# Patient Record
Sex: MALE | Race: WHITE
[De-identification: names, ages, dates, MRNs, and addresses within clinical notes are randomized per-mention and may not be internally consistent; named-entity substitution may affect disease eponyms.]

---

## 2019-01-02 ENCOUNTER — HOSPITAL ENCOUNTER (INPATIENT)
Dept: HOSPITAL 54 - ER | Age: 39
LOS: 1 days | Discharge: HOME | DRG: 310 | End: 2019-01-03
Attending: INTERNAL MEDICINE | Admitting: INTERNAL MEDICINE
Payer: COMMERCIAL

## 2019-01-02 VITALS — BODY MASS INDEX: 38.04 KG/M2 | HEIGHT: 68 IN | WEIGHT: 251 LBS

## 2019-01-02 DIAGNOSIS — E66.9: ICD-10-CM

## 2019-01-02 DIAGNOSIS — J45.909: ICD-10-CM

## 2019-01-02 DIAGNOSIS — Z88.0: ICD-10-CM

## 2019-01-02 DIAGNOSIS — Z82.49: ICD-10-CM

## 2019-01-02 DIAGNOSIS — I47.1: Primary | ICD-10-CM

## 2019-01-02 DIAGNOSIS — Z83.3: ICD-10-CM

## 2019-01-02 LAB
BASOPHILS # BLD AUTO: 0.1 /CMM (ref 0–0.2)
BASOPHILS NFR BLD AUTO: 0.7 % (ref 0–2)
BUN SERPL-MCNC: 13 MG/DL (ref 7–18)
CALCIUM SERPL-MCNC: 9.1 MG/DL (ref 8.5–10.1)
CHLORIDE SERPL-SCNC: 101 MMOL/L (ref 98–107)
CO2 SERPL-SCNC: 30 MMOL/L (ref 21–32)
CREAT SERPL-MCNC: 1.1 MG/DL (ref 0.6–1.3)
EOSINOPHIL NFR BLD AUTO: 1.7 % (ref 0–6)
GLUCOSE SERPL-MCNC: 90 MG/DL (ref 74–106)
HCT VFR BLD AUTO: 44 % (ref 39–51)
HGB BLD-MCNC: 15 G/DL (ref 13.5–17.5)
LYMPHOCYTES NFR BLD AUTO: 2.4 /CMM (ref 0.8–4.8)
LYMPHOCYTES NFR BLD AUTO: 28.2 % (ref 20–44)
MCHC RBC AUTO-ENTMCNC: 34 G/DL (ref 31–36)
MCV RBC AUTO: 82 FL (ref 80–96)
MONOCYTES NFR BLD AUTO: 0.7 /CMM (ref 0.1–1.3)
MONOCYTES NFR BLD AUTO: 7.7 % (ref 2–12)
NEUTROPHILS # BLD AUTO: 5.3 /CMM (ref 1.8–8.9)
NEUTROPHILS NFR BLD AUTO: 61.7 % (ref 43–81)
PLATELET # BLD AUTO: 260 /CMM (ref 150–450)
POTASSIUM SERPL-SCNC: 4 MMOL/L (ref 3.5–5.1)
RBC # BLD AUTO: 5.36 MIL/UL (ref 4.5–6)
SODIUM SERPL-SCNC: 140 MMOL/L (ref 136–145)
WBC NRBC COR # BLD AUTO: 8.6 K/UL (ref 4.3–11)

## 2019-01-02 PROCEDURE — G0378 HOSPITAL OBSERVATION PER HR: HCPCS

## 2019-01-02 NOTE — NUR
PATIENT IN BED, DENIES PAIN/DISCOMFORT AT THIS TIME, UPDATED REGARDING PLAN OF 
CARE, WITH VERBALIZATION OF UNDERSTANDING

## 2019-01-02 NOTE — NUR
PT BIB RA. COMP OF "MY HEART WAS RACING OUT OF MY CHEST". NO DIZZINESS NOTED. 
NO SOB NOTED. NO CHEST PAIN NOTED. NO ACUTE DISTRESS AT THIS TIME. PT AOX4. 
AMBULATORY W,ASSISTANCE. AWAITING MD LUNDBERG.

## 2019-01-02 NOTE — NUR
IV PRESENT UPON ARRIVAL, #18 G, LEFT HAND, FLUSHED WITH NS, PATENT AND INTACT, 
NO S/S OF INFILTRATION OR PHLEBITIS. TECH AT BEDSIDE FOR EKG, CXR TAKEN, URINE 
COLLECTED AND SENT TO LAB

## 2019-01-03 VITALS — DIASTOLIC BLOOD PRESSURE: 79 MMHG | SYSTOLIC BLOOD PRESSURE: 125 MMHG

## 2019-01-03 VITALS — SYSTOLIC BLOOD PRESSURE: 134 MMHG | DIASTOLIC BLOOD PRESSURE: 88 MMHG

## 2019-01-03 VITALS — DIASTOLIC BLOOD PRESSURE: 78 MMHG | SYSTOLIC BLOOD PRESSURE: 124 MMHG

## 2019-01-03 LAB
ALBUMIN SERPL BCP-MCNC: 3.4 G/DL (ref 3.4–5)
ALP SERPL-CCNC: 82 U/L (ref 46–116)
ALT SERPL W P-5'-P-CCNC: 53 U/L (ref 12–78)
AST SERPL W P-5'-P-CCNC: 26 U/L (ref 15–37)
BASOPHILS # BLD AUTO: 0.1 /CMM (ref 0–0.2)
BASOPHILS NFR BLD AUTO: 0.7 % (ref 0–2)
BILIRUB SERPL-MCNC: 0.5 MG/DL (ref 0.2–1)
BUN SERPL-MCNC: 13 MG/DL (ref 7–18)
CALCIUM SERPL-MCNC: 8.7 MG/DL (ref 8.5–10.1)
CHLORIDE SERPL-SCNC: 103 MMOL/L (ref 98–107)
CHOLEST SERPL-MCNC: 150 MG/DL (ref ?–200)
CO2 SERPL-SCNC: 29 MMOL/L (ref 21–32)
CREAT SERPL-MCNC: 0.9 MG/DL (ref 0.6–1.3)
EOSINOPHIL NFR BLD AUTO: 1.5 % (ref 0–6)
GLUCOSE SERPL-MCNC: 88 MG/DL (ref 74–106)
HCT VFR BLD AUTO: 40 % (ref 39–51)
HDLC SERPL-MCNC: 44 MG/DL (ref 40–60)
HGB BLD-MCNC: 13.6 G/DL (ref 13.5–17.5)
LDLC SERPL DIRECT ASSAY-MCNC: 105 MG/DL (ref 0–99)
LYMPHOCYTES NFR BLD AUTO: 2.9 /CMM (ref 0.8–4.8)
LYMPHOCYTES NFR BLD AUTO: 32.5 % (ref 20–44)
MAGNESIUM SERPL-MCNC: 1.9 MG/DL (ref 1.8–2.4)
MCHC RBC AUTO-ENTMCNC: 34 G/DL (ref 31–36)
MCV RBC AUTO: 82 FL (ref 80–96)
MONOCYTES NFR BLD AUTO: 0.8 /CMM (ref 0.1–1.3)
MONOCYTES NFR BLD AUTO: 8.7 % (ref 2–12)
NEUTROPHILS # BLD AUTO: 5 /CMM (ref 1.8–8.9)
NEUTROPHILS NFR BLD AUTO: 56.6 % (ref 43–81)
PHOSPHATE SERPL-MCNC: 3.8 MG/DL (ref 2.5–4.9)
PLATELET # BLD AUTO: 237 /CMM (ref 150–450)
POTASSIUM SERPL-SCNC: 3.8 MMOL/L (ref 3.5–5.1)
PROT SERPL-MCNC: 7.2 G/DL (ref 6.4–8.2)
RBC # BLD AUTO: 4.83 MIL/UL (ref 4.5–6)
SODIUM SERPL-SCNC: 139 MMOL/L (ref 136–145)
TRIGL SERPL-MCNC: 50 MG/DL (ref 30–150)
WBC NRBC COR # BLD AUTO: 8.9 K/UL (ref 4.3–11)

## 2019-01-03 NOTE — NUR
TELE RN NOTE

PT IN BED ASLEEP, EASILY AROUSABLE. NO DISTRESS OR DISCOMFORT NOTED. DENIES PAIN. ON TELE SR 
HR 70. ALL NEEDS ATTENDED. SIDE RAILS UP  X2 AND CALL LIGHT WITHIN REACH. WILL ENDORSE TO 
DAY SHIFT NURSE FOR CONTINUE TO CARE.

## 2019-01-03 NOTE — NUR
TELE RN NOTE

RECEIVED PT 38 YEARS OLD FROM ER WITH THE DX OF SVT BY DR LOPEZ. PT IS A/O X 4, NO SOB, NO 
DISTRESS OR DISCOMFORT NOTED. DENIES PAIN. ON TELE MONITOR SR. LT HAND # 18 G SL INTACT AND 
PATENT. VSS. NO SKIN ISSUES NOTED. ORIENTED THE PT TO HIS ROOM. SIDE RAILS UP X 3 AND CALL 
LIGHT WITHIN REACH. CONTINUE TO MONITOR HIM.

## 2019-01-03 NOTE — NUR
Patient resides locally with family. He is ambulatory and independent with adl's, working 
and driving. Has good family support. His pcp is Dr. Yuliet Figueroa. Plan to dc home today. 

-------------------------------------------------------------------------------

Addendum: 01/03/19 at 1627 by CHANO FELICIANO RN

-------------------------------------------------------------------------------

Amended: Links added.

## 2019-01-03 NOTE — NUR
PATIENT TRANSPORTED TO TELEMETRY 1ST FLOOR, ROOM 107 BY RN AND EMT VIA ACLS 
PROTOCOL, TOLERATED TRANSFER WELL. CARE ENDORSED TO NURSE ANGEL

## 2019-10-10 NOTE — NUR
Charge nurse discharging patient at this time. Pt AAOx4 with ABc's intact. Ambulatory with 
steady gait. IV removed with catheter tip intact. Gauze and tape placed. pt tolerated well. negative

## 2023-05-29 ENCOUNTER — HOSPITAL ENCOUNTER (EMERGENCY)
Dept: HOSPITAL 54 - ER | Age: 43
Discharge: HOME | End: 2023-05-29
Payer: COMMERCIAL

## 2023-05-29 VITALS — HEIGHT: 68 IN | WEIGHT: 240 LBS | BODY MASS INDEX: 36.37 KG/M2

## 2023-05-29 VITALS — DIASTOLIC BLOOD PRESSURE: 98 MMHG | SYSTOLIC BLOOD PRESSURE: 154 MMHG
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
## 2023-05-29 DIAGNOSIS — T18.8XXA: Primary | ICD-10-CM
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
## 2023-05-29 DIAGNOSIS — Z79.899: ICD-10-CM
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
## 2023-05-29 DIAGNOSIS — J45.909: ICD-10-CM

## 2023-05-29 DIAGNOSIS — Z60.2: ICD-10-CM
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
## 2023-05-29 SDOH — SOCIAL STABILITY - SOCIAL INSECURITY: PROBLEMS RELATED TO LIVING ALONE: Z60.2

## 2023-05-29 NOTE — NUR
Patient discharged to home in stable condition. Verbal after care instructions 
given. Patient verbalizes understanding of instruction.